# Patient Record
Sex: FEMALE | Race: BLACK OR AFRICAN AMERICAN | NOT HISPANIC OR LATINO | URBAN - NONMETROPOLITAN AREA
[De-identification: names, ages, dates, MRNs, and addresses within clinical notes are randomized per-mention and may not be internally consistent; named-entity substitution may affect disease eponyms.]

---

## 2017-04-24 NOTE — PATIENT DISCUSSION
Based on today's exam, diagnostic studies, and review of records, along with the patients functional difficulties which appear to be caused by the vitreous opacities/debris, it was recommended the patient proceed with a VITRECTOMY to remove the vitreous debris/floaters. The risk, alternatives, benefits, and complications that can arise from a vitrectomy were reviewed and the patient elected to proceed with surgery. A consent was reviewed and signed. The patient is schedule for surgery within the next month.

## 2017-05-09 NOTE — PATIENT DISCUSSION
Patient to start EYE DROPS in the operative eye; PRED- FORTE/PREDNISOLONE ACETATE 1% & ANTIBIOTIC eye drops FOUR TIMES A DAY FOR ONE WEEK, then reduce them to [place], then stop the drops. Wait at least one minute between drops.

## 2017-05-10 NOTE — PATIENT DISCUSSION
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

## 2017-05-24 NOTE — PATIENT DISCUSSION
SOMETIMES DOUBLE OTHER TIMES NOT - IMPROVING - RECOM EVAL DR HAYNES - IF HE CONTINUES TO BE SYMPTOMATIC WOULD CONSIDER EVAL BY DR MALHOTRA.

## 2017-09-27 NOTE — PATIENT DISCUSSION
IN RIGHT EYE AT DISTANCE WITH GLASSES BUT NOT WITHOUT - ? RELATED TO PRISM - HOWEVER PT NEEDS PRISM - RECOM F/U DR HAYNES TO SEE IF ANYTHING MORE IS NEEDED.

## 2018-12-18 PROBLEM — Z96.1: Noted: 2018-12-18

## 2018-12-18 PROBLEM — H04.123: Noted: 2018-12-18

## 2018-12-18 PROBLEM — H40.013: Noted: 2018-12-18

## 2019-06-12 ENCOUNTER — IMPORTED ENCOUNTER (OUTPATIENT)
Dept: URBAN - NONMETROPOLITAN AREA CLINIC 1 | Facility: CLINIC | Age: 74
End: 2019-06-12

## 2019-06-12 PROCEDURE — 92133 CPTRZD OPH DX IMG PST SGM ON: CPT

## 2019-06-12 NOTE — PATIENT DISCUSSION
6/12/19 OCT ONH Glaucoma suspect - monitor. PC IOL OU-stable continue to monitorDES OU-continue Refresh Optive gel drops OU prnRTC x 6 months for CEE (Dr Karen Belcher).

## 2019-06-18 ENCOUNTER — IMPORTED ENCOUNTER (OUTPATIENT)
Dept: URBAN - NONMETROPOLITAN AREA CLINIC 1 | Facility: CLINIC | Age: 74
End: 2019-06-18

## 2019-06-18 PROCEDURE — 92015 DETERMINE REFRACTIVE STATE: CPT

## 2019-06-18 PROCEDURE — 99213 OFFICE O/P EST LOW 20 MIN: CPT

## 2019-06-18 NOTE — PATIENT DISCUSSION
Glaucoma suspect - monitor. -OCT ONH reviewed w/pt new field damage OS repeat VF in 6 Mos-IOP 21:16 06/18/19-Continue to R Radha Orellana 116 continue to monitorDES OU-continue Refresh Optive gel drops OU prnRTC 6 Mo f/U POAG VF

## 2019-12-10 ENCOUNTER — IMPORTED ENCOUNTER (OUTPATIENT)
Dept: URBAN - NONMETROPOLITAN AREA CLINIC 1 | Facility: CLINIC | Age: 74
End: 2019-12-10

## 2019-12-10 PROCEDURE — 92012 INTRM OPH EXAM EST PATIENT: CPT

## 2019-12-10 PROCEDURE — 92083 EXTENDED VISUAL FIELD XM: CPT

## 2019-12-10 NOTE — PATIENT DISCUSSION
Glaucoma suspect - monitor. -VF done today and discussed with patient todayOS appears to have slight changes.  OD stable-IOP 20 OU 12/10/19-Continue to monitorPC IOL OU-stable continue to monitorDES OU-continue Refresh Optive gel drops OU prn

## 2022-04-09 ASSESSMENT — VISUAL ACUITY
OD_CC: 20/20
OS_CC: 20/70-1
OD_CC: 20/30
OS_CC: 20/40+2

## 2022-04-09 ASSESSMENT — TONOMETRY
OS_IOP_MMHG: 20
OS_IOP_MMHG: 16
OD_IOP_MMHG: 20
OD_IOP_MMHG: 21